# Patient Record
Sex: FEMALE | Race: OTHER | Employment: OTHER | URBAN - METROPOLITAN AREA
[De-identification: names, ages, dates, MRNs, and addresses within clinical notes are randomized per-mention and may not be internally consistent; named-entity substitution may affect disease eponyms.]

---

## 2024-11-02 ENCOUNTER — HOSPITAL ENCOUNTER (EMERGENCY)
Facility: HOSPITAL | Age: 69
Discharge: HOME | End: 2024-11-03
Attending: EMERGENCY MEDICINE | Admitting: EMERGENCY MEDICINE
Payer: COMMERCIAL

## 2024-11-02 DIAGNOSIS — I10 UNCONTROLLED HYPERTENSION: Primary | ICD-10-CM

## 2024-11-02 DIAGNOSIS — R20.2 PARESTHESIA OF ARM: ICD-10-CM

## 2024-11-02 PROCEDURE — 93005 ELECTROCARDIOGRAM TRACING: CPT

## 2024-11-02 PROCEDURE — 99284 EMERGENCY DEPT VISIT MOD MDM: CPT

## 2024-11-02 RX ORDER — ATORVASTATIN CALCIUM 40 MG/1
40 TABLET, FILM COATED ORAL DAILY
COMMUNITY

## 2024-11-02 RX ORDER — LOSARTAN POTASSIUM 100 MG/1
100 TABLET ORAL DAILY
COMMUNITY

## 2024-11-02 RX ORDER — ATENOLOL 100 MG/1
100 TABLET ORAL DAILY
COMMUNITY

## 2024-11-03 ENCOUNTER — APPOINTMENT (EMERGENCY)
Dept: CT IMAGING | Facility: HOSPITAL | Age: 69
End: 2024-11-03
Payer: COMMERCIAL

## 2024-11-03 VITALS
OXYGEN SATURATION: 96 % | RESPIRATION RATE: 18 BRPM | DIASTOLIC BLOOD PRESSURE: 70 MMHG | HEART RATE: 59 BPM | SYSTOLIC BLOOD PRESSURE: 167 MMHG | TEMPERATURE: 98.5 F

## 2024-11-03 LAB
2HR DELTA HS TROPONIN: 1 NG/L
ALBUMIN SERPL BCG-MCNC: 3.9 G/DL (ref 3.5–5)
ALP SERPL-CCNC: 67 U/L (ref 34–104)
ALT SERPL W P-5'-P-CCNC: 16 U/L (ref 7–52)
ANION GAP SERPL CALCULATED.3IONS-SCNC: 7 MMOL/L (ref 4–13)
APTT PPP: 31 SECONDS (ref 23–34)
AST SERPL W P-5'-P-CCNC: 15 U/L (ref 13–39)
ATRIAL RATE: 50 BPM
ATRIAL RATE: 55 BPM
BACTERIA UR QL AUTO: NORMAL /HPF
BASOPHILS # BLD AUTO: 0.05 THOUSANDS/ΜL (ref 0–0.1)
BASOPHILS NFR BLD AUTO: 1 % (ref 0–1)
BILIRUB SERPL-MCNC: 0.67 MG/DL (ref 0.2–1)
BILIRUB UR QL STRIP: NEGATIVE
BUN SERPL-MCNC: 19 MG/DL (ref 5–25)
CALCIUM SERPL-MCNC: 8.8 MG/DL (ref 8.4–10.2)
CARDIAC TROPONIN I PNL SERPL HS: 12 NG/L
CARDIAC TROPONIN I PNL SERPL HS: 13 NG/L
CHLORIDE SERPL-SCNC: 102 MMOL/L (ref 96–108)
CLARITY UR: CLEAR
CO2 SERPL-SCNC: 29 MMOL/L (ref 21–32)
COLOR UR: COLORLESS
CREAT SERPL-MCNC: 0.7 MG/DL (ref 0.6–1.3)
EOSINOPHIL # BLD AUTO: 0.21 THOUSAND/ΜL (ref 0–0.61)
EOSINOPHIL NFR BLD AUTO: 3 % (ref 0–6)
ERYTHROCYTE [DISTWIDTH] IN BLOOD BY AUTOMATED COUNT: 12.6 % (ref 11.6–15.1)
GFR SERPL CREATININE-BSD FRML MDRD: 87 ML/MIN/1.73SQ M
GLUCOSE SERPL-MCNC: 105 MG/DL (ref 65–140)
GLUCOSE UR STRIP-MCNC: NEGATIVE MG/DL
HCT VFR BLD AUTO: 39.4 % (ref 34.8–46.1)
HGB BLD-MCNC: 13.5 G/DL (ref 11.5–15.4)
HGB UR QL STRIP.AUTO: ABNORMAL
IMM GRANULOCYTES # BLD AUTO: 0.02 THOUSAND/UL (ref 0–0.2)
IMM GRANULOCYTES NFR BLD AUTO: 0 % (ref 0–2)
INR PPP: 1.01 (ref 0.85–1.19)
KETONES UR STRIP-MCNC: NEGATIVE MG/DL
LEUKOCYTE ESTERASE UR QL STRIP: NEGATIVE
LIPASE SERPL-CCNC: 41 U/L (ref 11–82)
LYMPHOCYTES # BLD AUTO: 1.75 THOUSANDS/ΜL (ref 0.6–4.47)
LYMPHOCYTES NFR BLD AUTO: 23 % (ref 14–44)
MCH RBC QN AUTO: 31.7 PG (ref 26.8–34.3)
MCHC RBC AUTO-ENTMCNC: 34.3 G/DL (ref 31.4–37.4)
MCV RBC AUTO: 93 FL (ref 82–98)
MONOCYTES # BLD AUTO: 0.87 THOUSAND/ΜL (ref 0.17–1.22)
MONOCYTES NFR BLD AUTO: 12 % (ref 4–12)
NEUTROPHILS # BLD AUTO: 4.57 THOUSANDS/ΜL (ref 1.85–7.62)
NEUTS SEG NFR BLD AUTO: 61 % (ref 43–75)
NITRITE UR QL STRIP: NEGATIVE
NON-SQ EPI CELLS URNS QL MICRO: NORMAL /HPF
NRBC BLD AUTO-RTO: 0 /100 WBCS
P AXIS: 56 DEGREES
P AXIS: 60 DEGREES
PH UR STRIP.AUTO: 6.5 [PH]
PLATELET # BLD AUTO: 212 THOUSANDS/UL (ref 149–390)
PMV BLD AUTO: 10.6 FL (ref 8.9–12.7)
POTASSIUM SERPL-SCNC: 3.5 MMOL/L (ref 3.5–5.3)
PR INTERVAL: 210 MS
PR INTERVAL: 216 MS
PROT SERPL-MCNC: 6.7 G/DL (ref 6.4–8.4)
PROT UR STRIP-MCNC: NEGATIVE MG/DL
PROTHROMBIN TIME: 13.5 SECONDS (ref 12.3–15)
QRS AXIS: 35 DEGREES
QRS AXIS: 38 DEGREES
QRSD INTERVAL: 86 MS
QRSD INTERVAL: 88 MS
QT INTERVAL: 422 MS
QT INTERVAL: 444 MS
QTC INTERVAL: 403 MS
QTC INTERVAL: 404 MS
RBC # BLD AUTO: 4.26 MILLION/UL (ref 3.81–5.12)
RBC #/AREA URNS AUTO: NORMAL /HPF
SODIUM SERPL-SCNC: 138 MMOL/L (ref 135–147)
SP GR UR STRIP.AUTO: 1 (ref 1–1.03)
T WAVE AXIS: 53 DEGREES
T WAVE AXIS: 67 DEGREES
UROBILINOGEN UR STRIP-ACNC: <2 MG/DL
VENTRICULAR RATE: 50 BPM
VENTRICULAR RATE: 55 BPM
WBC # BLD AUTO: 7.47 THOUSAND/UL (ref 4.31–10.16)
WBC #/AREA URNS AUTO: NORMAL /HPF

## 2024-11-03 PROCEDURE — 99285 EMERGENCY DEPT VISIT HI MDM: CPT

## 2024-11-03 PROCEDURE — 70450 CT HEAD/BRAIN W/O DYE: CPT

## 2024-11-03 PROCEDURE — 81001 URINALYSIS AUTO W/SCOPE: CPT

## 2024-11-03 PROCEDURE — 36415 COLL VENOUS BLD VENIPUNCTURE: CPT

## 2024-11-03 PROCEDURE — 93010 ELECTROCARDIOGRAM REPORT: CPT | Performed by: INTERNAL MEDICINE

## 2024-11-03 PROCEDURE — 85730 THROMBOPLASTIN TIME PARTIAL: CPT

## 2024-11-03 PROCEDURE — 84484 ASSAY OF TROPONIN QUANT: CPT

## 2024-11-03 PROCEDURE — 85025 COMPLETE CBC W/AUTO DIFF WBC: CPT

## 2024-11-03 PROCEDURE — 80053 COMPREHEN METABOLIC PANEL: CPT

## 2024-11-03 PROCEDURE — 93005 ELECTROCARDIOGRAM TRACING: CPT

## 2024-11-03 PROCEDURE — 85610 PROTHROMBIN TIME: CPT

## 2024-11-03 PROCEDURE — 83690 ASSAY OF LIPASE: CPT

## 2024-11-03 RX ORDER — ASPIRIN 325 MG
325 TABLET ORAL ONCE
Status: COMPLETED | OUTPATIENT
Start: 2024-11-03 | End: 2024-11-03

## 2024-11-03 RX ORDER — ASPIRIN 81 MG/1
81 TABLET, CHEWABLE ORAL DAILY
Qty: 20 TABLET | Refills: 0 | Status: SHIPPED | OUTPATIENT
Start: 2024-11-03

## 2024-11-03 RX ADMIN — ASPIRIN 325 MG ORAL TABLET 325 MG: 325 PILL ORAL at 02:07

## 2024-11-03 NOTE — DISCHARGE INSTRUCTIONS
Tuvo un episodio de hormigueo facial en el lado dilma y hormigueo en el brazo dilma. Pérez presión arterial estaba elevada en rafy momento. Para cuando llegó a la ava de emergencias, pérez presión arterial había carol y pérez hormigueo había desaparecido. Es posible que se tratara de un accidente cerebrovascular pequeño o transitorio, o que los síntomas se deban a la presión arterial.    [place] aspirina diariamente según lo recetado    [place] un seguimiento con pérez cardiólogo y médico de cabecera lo antes posible    Regresa por entumecimiento, debilidad, mareos, cambios en la visión, dolor en el pecho, dificultad para respirar o cualquier otro síntoma nuevo o preocupante     ====================  You had an episode of left sided facial tingling, and left arm tingling. Your blood pressure was elevated at that time. By the time you got to the ER, your blood pressure was better and your tingling was gone. It is possible this was a mini or transient stroke, or the symptoms may have been from your blood pressure.    Take Aspirin daily as prescribed    Follow up with your Cardiologist and PCP as soon as possible    Return for numbness, weakness, dizziness, vision changes, chest pain, trouble breathing, or any other new/concerning symptoms

## 2024-11-04 NOTE — ED PROVIDER NOTES
Time reflects when diagnosis was documented in both MDM as applicable and the Disposition within this note       Time User Action Codes Description Comment    11/3/2024  1:49 AM Hannah Astorga [I10] Uncontrolled hypertension     11/3/2024  1:49 AM Hannah Astorga [R20.2] Paresthesia of arm           ED Disposition       ED Disposition   Discharge    Condition   Stable    Date/Time   Sun Nov 3, 2024  1:48 AM    Comment   Sri ESCOTO Huber Vasquez discharge to home/self care.                   Assessment & Plan       Medical Decision Making  Will obtain EKG, troponin to evaluate for ACS. Will obtain CBC to evaluate for leukocytosis, anemia.  Will obtain CMP to evaluate kidney function, for electrolyte disturbance.  Will obtain coags.  Will obtain CTH to evaluate for ICH, intracranial process.  Will obtain UA to evaluate for proteinuria as evidence of end organ damage.      Lab work unremarkable.  CT head without acute findings.  Findings, as well as incidental findings of retention cyst versus mucosal polyp discussed with patient and family (using  services) who note that they were already aware of this.  I had an extensive discussion with the patient and family regarding the patient's symptoms today, discussed with them that it is possible this was a TIA.  ABCD2 score 2, low risk.  Recommend starting patient on aspirin.  Given no recurrence of symptoms, and improvement of symptoms with improvement in hypertension, will discharge patient with close PCP follow-up.  Patient and family agreeable with this plan.  Patient has been bradycardic in ED, however is on atenolol and did take dose prior to arrival.  Per chart review, during cardiology visit patient's heart rate was in the 50s.  Patient is not experiencing any symptoms at this time. HEART score 4; pt is following closely with her cardiologist regarding her BP.    At the time of discharge, the patient is in no acute distress. I discussed with  the patient the diagnosis, treatment plan, follow-up, return precautions, and discharge instructions; they were given the opportunity to ask questions and verbalized understanding.    Problems Addressed:  Paresthesia of arm: acute illness or injury  Uncontrolled hypertension: acute illness or injury    Amount and/or Complexity of Data Reviewed  External Data Reviewed: labs, radiology and notes.  Labs: ordered. Decision-making details documented in ED Course.  Radiology: ordered. Decision-making details documented in ED Course.  ECG/medicine tests: ordered and independent interpretation performed. Decision-making details documented in ED Course.  Discussion of management or test interpretation with external provider(s): ED attending (Dr. Perez)    Risk  OTC drugs.      HEART score:    History 1=Moderate suspicious   ECG 0=Normal   Age 2= > 65 years   Risk Factors 1= 1 or 2 risk factors   Troponin 0= Less than or equal to 12 ng/L   Total 4        ED Course as of 11/03/24 2301   Sun Nov 03, 2024   0015 POCT URINE PROTEIN: Negative   0015 EKG sinus bradycardia with first-degree AV block at 55 bpm, , QTc 430, normal axis, no acute ischemic changes, no previous EKG for comparison as interpreted by me   0103 hs TnI 0hr: 12   0121 CT head without contrast  FINDINGS:     PARENCHYMA:No intracranial mass, mass effect or midline shift. No CT signs of acute infarction.  No acute parenchymal hemorrhage.     VENTRICLES AND EXTRA-AXIAL SPACES:  Normal for the patient's age.     VISUALIZED ORBITS:  Normal visualized orbits.     PARANASAL SINUSES:  There is a partially visualized mucous retention cyst versus mucosal polyp at the right maxillary sinus. There is mild mucosal thickening of the visualized left maxillary sinus. No air-fluid levels are identified.     CALVARIUM AND EXTRACRANIAL SOFT TISSUES:  Normal.     IMPRESSION:     No acute intracranial abnormality.     0122 Creatinine: 0.70   0122 GFR, Calculated: 87        Medications   aspirin tablet 325 mg (325 mg Oral Given 11/3/24 0207)       ED Risk Strat Scores                           SBIRT 22yo+      Flowsheet Row Most Recent Value   Initial Alcohol Screen: US AUDIT-C     1. How often do you have a drink containing alcohol? 0 Filed at: 11/02/2024 2323   2. How many drinks containing alcohol do you have on a typical day you are drinking?  0 Filed at: 11/02/2024 2323   3b. FEMALE Any Age, or MALE 65+: How often do you have 4 or more drinks on one occassion? 0 Filed at: 11/02/2024 2323   Audit-C Score 0 Filed at: 11/02/2024 2323   SERGEY: How many times in the past year have you...    Used an illegal drug or used a prescription medication for non-medical reasons? Never Filed at: 11/02/2024 2323                            History of Present Illness       Chief Complaint   Patient presents with    Hypertension     Pt states high blood pressures at home and experiencing headaches       Past Medical History:   Diagnosis Date    Bilateral cataracts     Hyperlipidemia     Hypertension       Past Surgical History:   Procedure Laterality Date    COLONOSCOPY      PARTIAL HYSTERECTOMY        History reviewed. No pertinent family history.   Social History     Tobacco Use    Smoking status: Former     Types: Cigarettes    Smokeless tobacco: Never   Substance Use Topics    Alcohol use: Never    Drug use: Never      E-Cigarette/Vaping      E-Cigarette/Vaping Substances      I have reviewed and agree with the history as documented.     The patient is a 69-year-old female with history of hypertension, hyperlipidemia who presents to the ED for evaluation after noting an elevated blood pressure at home.  She reports that after eating dinner, she developed a vague headache, and paresthesias to the left side of her face and left arm.  She reports she feels that her headache radiated into her left arm.  She denies experiencing chest pain or dyspnea at that time.  Her daughter then took her blood  pressure, noted that it was elevated with a systolic of 220.  This prompted ED evaluation.  Patient's daughter reports that she was suspicious the patient did not take her blood pressure medications this morning, and gave her a dose of her atenolol and losartan prior to arrival to the ED.  The patient reports that upon arrival to the ED, her symptoms resolved.  She reports that lasted approximately 20 minutes.  She endorses only a very mild ongoing headache.  She otherwise denies recent fever, chills, vision changes, dizziness, lightheadedness, syncope, abdominal pain, nausea, vomiting, jaw pain, back pain, focal weakness, dysuria, hematuria, diarrhea, melena, hematochezia, leg swelling, calf pain, balance problems.  HPI obtained using  services.        Review of Systems   Constitutional:  Negative for chills and fever.   HENT:  Negative for congestion and rhinorrhea.    Eyes:  Negative for photophobia and visual disturbance.   Respiratory:  Negative for cough and shortness of breath.    Cardiovascular:  Negative for chest pain and leg swelling.   Gastrointestinal:  Negative for abdominal pain, constipation, diarrhea, nausea and vomiting.   Genitourinary:  Negative for dysuria and flank pain.   Musculoskeletal:  Positive for myalgias.   Skin:  Negative for rash and wound.   Neurological:  Positive for headaches. Negative for dizziness and weakness.           Objective       ED Triage Vitals   Temperature Pulse Blood Pressure Respirations SpO2 Patient Position - Orthostatic VS   11/02/24 2306 11/02/24 2306 11/02/24 2306 11/02/24 2306 11/02/24 2306 11/02/24 2306   98.5 °F (36.9 °C) 56 (!) 199/93 18 97 % Sitting      Temp Source Heart Rate Source BP Location FiO2 (%) Pain Score    11/02/24 2306 11/02/24 2306 11/02/24 2306 -- 11/02/24 2322    Oral Monitor Right arm  2      Vitals      Date and Time Temp Pulse SpO2 Resp BP Pain Score FACES Pain Rating User   11/03/24 0209 -- 59 96 % 18 -- -- -- CG   11/03/24  0130 -- 48 96 % 18 167/70 -- -- MA   11/03/24 0100 -- 49 95 % 18 164/67 -- -- MA   11/03/24 0015 -- 50 95 % 20 187/86 -- -- MA   11/02/24 2330 -- 51 94 % 18 167/79 -- -- MA   11/02/24 2322 -- -- -- -- -- 2 -- MA   11/02/24 2321 -- 56 95 % 18 207/93 -- -- MA   11/02/24 2306 98.5 °F (36.9 °C) 56 97 % 18 199/93 -- -- ES            Physical Exam  Vitals and nursing note reviewed.   Constitutional:       General: She is not in acute distress.     Appearance: She is well-developed. She is not toxic-appearing or diaphoretic.   HENT:      Head: Normocephalic and atraumatic.   Eyes:      Extraocular Movements: Extraocular movements intact.      Conjunctiva/sclera: Conjunctivae normal.      Pupils: Pupils are equal, round, and reactive to light.   Cardiovascular:      Rate and Rhythm: Normal rate and regular rhythm.      Heart sounds: No murmur heard.  Pulmonary:      Effort: Pulmonary effort is normal. No respiratory distress.      Breath sounds: Normal breath sounds.   Abdominal:      Palpations: Abdomen is soft.      Tenderness: There is no abdominal tenderness.   Musculoskeletal:         General: No swelling.      Cervical back: Neck supple.   Skin:     General: Skin is warm.      Capillary Refill: Capillary refill takes less than 2 seconds.   Neurological:      General: No focal deficit present.      Mental Status: She is alert.      GCS: GCS eye subscore is 4. GCS verbal subscore is 5. GCS motor subscore is 6.      Cranial Nerves: Cranial nerves 2-12 are intact.      Sensory: Sensation is intact.      Motor: Motor function is intact.      Coordination: Coordination is intact.   Psychiatric:         Mood and Affect: Mood normal.         Results Reviewed       Procedure Component Value Units Date/Time    HS Troponin I 2hr [[id number]]  (Normal) Collected: 11/03/24 0057    Lab Status: Final result Specimen: Blood from Arm, Left Updated: 11/03/24 0127     hs TnI 2hr 13 ng/L      Delta 2hr hsTnI 1 ng/L     HS Troponin 0hr  (reflex protocol) [[id number]]  (Normal) Collected: 11/03/24 0003    Lab Status: Final result Specimen: Blood from Arm, Left Updated: 11/03/24 0031     hs TnI 0hr 12 ng/L     Comprehensive metabolic panel [[id number]] Collected: 11/03/24 0003    Lab Status: Final result Specimen: Blood from Arm, Left Updated: 11/03/24 0024     Sodium 138 mmol/L      Potassium 3.5 mmol/L      Chloride 102 mmol/L      CO2 29 mmol/L      ANION GAP 7 mmol/L      BUN 19 mg/dL      Creatinine 0.70 mg/dL      Glucose 105 mg/dL      Calcium 8.8 mg/dL      AST 15 U/L      ALT 16 U/L      Alkaline Phosphatase 67 U/L      Total Protein 6.7 g/dL      Albumin 3.9 g/dL      Total Bilirubin 0.67 mg/dL      eGFR 87 ml/min/1.73sq m     Narrative:      [employer] guidelines for Chronic Kidney Disease (CKD):     Stage 1 with normal or high GFR (GFR > 90 mL/min/1.73 square meters)    Stage 2 Mild CKD (GFR = 60-89 mL/min/1.73 square meters)    Stage 3A Moderate CKD (GFR = 45-59 mL/min/1.73 square meters)    Stage 3B Moderate CKD (GFR = 30-44 mL/min/1.73 square meters)    Stage 4 Severe CKD (GFR = 15-29 mL/min/1.73 square meters)    Stage 5 End Stage CKD (GFR <15 mL/min/1.73 square meters)  Note: GFR calculation is accurate only with a steady state creatinine    Lipase [[id number]]  (Normal) Collected: 11/03/24 0003    Lab Status: Final result Specimen: Blood from Arm, Left Updated: 11/03/24 0024     Lipase 41 u/L     Protime-INR [[id number]]  (Normal) Collected: 11/03/24 0003    Lab Status: Final result Specimen: Blood from Arm, Left Updated: 11/03/24 0023     Protime 13.5 seconds      INR 1.01    Narrative:      INR Therapeutic Range    Indication                                             INR Range      Atrial Fibrillation                                               2.0-3.0  Hypercoagulable State                                    2.0.2.3  Left Ventricular Asist Device                            2.0-3.0  Mechanical Heart Valve                                   -    Aortic(with afib, MI, embolism, HF, LA enlargement,    and/or coagulopathy)                                     2.0-3.0 (2.5-3.5)     Mitral                                                             2.5-3.5  Prosthetic/Bioprosthetic Heart Valve               2.0-3.0  Venous thromboembolism (VTE: VT, PE        2.0-3.0    APTT [[id number]]  (Normal) Collected: 11/03/24 0003    Lab Status: Final result Specimen: Blood from Arm, Left Updated: 11/03/24 0023     PTT 31 seconds     CBC and differential [[id number]] Collected: 11/03/24 0003    Lab Status: Final result Specimen: Blood from Arm, Left Updated: 11/03/24 0020     WBC 7.47 Thousand/uL      RBC 4.26 Million/uL      Hemoglobin 13.5 g/dL      Hematocrit 39.4 %      MCV 93 fL      MCH 31.7 pg      MCHC 34.3 g/dL      RDW 12.6 %      MPV 10.6 fL      Platelets 212 Thousands/uL      nRBC 0 /100 WBCs      Segmented % 61 %      Immature Grans % 0 %      Lymphocytes % 23 %      Monocytes % 12 %      Eosinophils Relative 3 %      Basophils Relative 1 %      Absolute Neutrophils 4.57 Thousands/µL      Absolute Immature Grans 0.02 Thousand/uL      Absolute Lymphocytes 1.75 Thousands/µL      Absolute Monocytes 0.87 Thousand/µL      Eosinophils Absolute 0.21 Thousand/µL      Basophils Absolute 0.05 Thousands/µL     Urine Microscopic [[id number]]  (Normal) Collected: 11/03/24 0003    Lab Status: Final result Specimen: Urine, Clean Catch Updated: 11/03/24 0014     RBC, UA 1-2 /hpf      WBC, UA None Seen /hpf      Epithelial Cells Occasional /hpf      Bacteria, UA None Seen /hpf     UA w Reflex to Microscopic w Reflex to Culture [[id number]]  (Abnormal) Collected: 11/03/24 0003    Lab Status: Final result Specimen: Urine, Clean Catch Updated: 11/03/24 0013     Color, UA Colorless     Clarity, UA Clear     Specific Gravity, UA 1.004     pH, UA 6.5     Leukocytes, UA Negative     Nitrite, UA Negative     Protein, UA Negative mg/dl      Glucose, UA  Negative mg/dl      Ketones, UA Negative mg/dl      Urobilinogen, UA <2.0 mg/dl      Bilirubin, UA Negative     Occult Blood, UA Trace            CT head without contrast   Final Interpretation by John Flores (11/03 0154)         FINDINGS:     PARENCHYMA:No intracranial mass, mass effect or midline shift. No CT signs of acute infarction.  No acute parenchymal hemorrhage.     VENTRICLES AND EXTRA-AXIAL SPACES:  Normal for the patient's age.     VISUALIZED ORBITS:  Normal visualized orbits.     PARANASAL SINUSES:  There is a partially visualized mucous retention cyst versus mucosal polyp at the right maxillary sinus. There is mild mucosal thickening of the visualized left maxillary sinus. No air-fluid levels are identified.     CALVARIUM AND EXTRACRANIAL SOFT TISSUES:  Normal.     IMPRESSION:     No acute intracranial abnormality.       Procedures    ED Medication and Procedure Management   Prior to Admission Medications   Prescriptions Last Dose Informant Patient Reported? Taking?   atenolol (TENORMIN) 100 mg tablet 11/2/2024  Yes Yes   Sig: Take 100 mg by mouth daily   atorvastatin (LIPITOR) 40 mg tablet 11/2/2024  Yes Yes   Sig: Take 40 mg by mouth daily   losartan (COZAAR) 100 MG tablet 11/2/2024  Yes Yes   Sig: Take 100 mg by mouth daily      Facility-Administered Medications: None     Discharge Medication List as of 11/3/2024  2:02 AM        START taking these medications    Details   aspirin 81 mg chewable tablet Chew 1 tablet (81 mg total) daily, Starting Sun 11/3/2024, Normal           CONTINUE these medications which have NOT CHANGED    Details   atenolol (TENORMIN) 100 mg tablet Take 100 mg by mouth daily, Historical Med      atorvastatin (LIPITOR) 40 mg tablet Take 40 mg by mouth daily, Historical Med      losartan (COZAAR) 100 MG tablet Take 100 mg by mouth daily, Historical Med           No discharge procedures on file.  ED SEPSIS DOCUMENTATION   Time reflects when diagnosis was documented in  both MDM as applicable and the Disposition within this note       Time User Action Codes Description Comment    11/3/2024  1:49 AM Hannah Astorga [I10] Uncontrolled hypertension     11/3/2024  1:49 AM Hannah Astorga [R20.2] Paresthesia of arm                  Hannah Astorga PA-C  11/04/24 0016